# Patient Record
Sex: MALE | Employment: FULL TIME | URBAN - METROPOLITAN AREA
[De-identification: names, ages, dates, MRNs, and addresses within clinical notes are randomized per-mention and may not be internally consistent; named-entity substitution may affect disease eponyms.]

---

## 2020-04-13 ENCOUNTER — EMPLOYEE HEALTH (OUTPATIENT)
Dept: OTHER | Facility: HOSPITAL | Age: 40
End: 2020-04-13
Attending: FAMILY MEDICINE

## 2020-04-13 DIAGNOSIS — Z11.1 SCREENING-PULMONARY TB: Primary | ICD-10-CM

## 2020-04-13 DIAGNOSIS — Z01.84 IMMUNITY STATUS TESTING: ICD-10-CM

## 2020-04-13 PROCEDURE — 86787 VARICELLA-ZOSTER ANTIBODY: CPT

## 2020-04-13 PROCEDURE — 86480 TB TEST CELL IMMUN MEASURE: CPT

## 2020-12-18 ENCOUNTER — IMMUNIZATION (OUTPATIENT)
Dept: LAB | Facility: HOSPITAL | Age: 40
End: 2020-12-18
Attending: PREVENTIVE MEDICINE

## 2020-12-18 DIAGNOSIS — Z23 NEED FOR VACCINATION: ICD-10-CM

## 2020-12-18 PROCEDURE — 0001A PFIZER-BIONTECH COVID-19 VACCINE: CPT

## 2021-01-08 ENCOUNTER — IMMUNIZATION (OUTPATIENT)
Dept: LAB | Facility: HOSPITAL | Age: 41
End: 2021-01-08
Attending: PREVENTIVE MEDICINE

## 2021-01-08 DIAGNOSIS — Z23 NEED FOR VACCINATION: ICD-10-CM

## 2021-01-08 PROCEDURE — 0002A SARSCOV2 VAC 30MCG/0.3ML IM: CPT

## 2021-03-16 ENCOUNTER — TELEPHONE (OUTPATIENT)
Dept: INTERNAL MEDICINE CLINIC | Facility: HOSPITAL | Age: 41
End: 2021-03-16

## 2021-03-16 ENCOUNTER — LAB ENCOUNTER (OUTPATIENT)
Dept: LAB | Age: 41
End: 2021-03-16
Attending: PREVENTIVE MEDICINE
Payer: COMMERCIAL

## 2021-03-16 DIAGNOSIS — Z20.822 SUSPECTED COVID-19 VIRUS INFECTION: ICD-10-CM

## 2021-03-16 DIAGNOSIS — Z20.822 SUSPECTED COVID-19 VIRUS INFECTION: Primary | ICD-10-CM

## 2021-03-16 LAB — SARS-COV-2 RNA RESP QL NAA+PROBE: NOT DETECTED

## 2021-03-16 NOTE — TELEPHONE ENCOUNTER
Department: Linen                               [x] [facility]  [facility]   [] [facility]    Dept Manager/Supervisor/team or clinical lead: Sandy Santiago    Position:  [] MD     [] RN     [] Respiratory Therapist     [] PCT     [] Other: linen Services When was the last shift you worked? Saturday night: 03.13.2021  When are you next scheduled to work?  Today,     Did you have close contact with someone on your unit while not wearing a mask? (e.g., during meal breaks):  Yes []   No [x]    If yes, who: an appointment for their testing and remain in their vehicle when arrived at site until they are contacted for testing    [x]  Plan noted above  [x]  Length of time to obtain results  [x]  Quarantine instructions  [x]  S/S of worsening infection/condition

## 2021-03-16 NOTE — TELEPHONE ENCOUNTER
Results and RTW guidelines:    COVID RESULT GIVEN:      Test type:    [] Rapid         [x] Alinity      [x] NEGATIVE     Ordered Alinity retest?  []Yes   [x] No   Notes: Fully Vaccinated, living in house with 7 Covid Positive ppl.  Temp 99.5-100 with me days prior to RTW date  [] RTW immediately, continue to monitor for sx  [x] RTW when sx improve- fever free for 24 hours w/o medications, Diarrhea/Vomiting for 24 hours w/o medications  [x] Alinity ordered; continue to monitor sx and call for new/worsening

## 2021-03-17 ENCOUNTER — TELEPHONE (OUTPATIENT)
Dept: INTERNAL MEDICINE CLINIC | Facility: HOSPITAL | Age: 41
End: 2021-03-17

## 2021-03-18 ENCOUNTER — LAB ENCOUNTER (OUTPATIENT)
Dept: LAB | Age: 41
End: 2021-03-18
Attending: NURSE PRACTITIONER
Payer: COMMERCIAL

## 2021-03-18 DIAGNOSIS — Z20.822 SUSPECTED COVID-19 VIRUS INFECTION: ICD-10-CM

## 2021-03-19 LAB — SARS-COV-2 RNA RESP QL NAA+PROBE: NOT DETECTED

## 2022-01-01 ENCOUNTER — TELEPHONE (OUTPATIENT)
Dept: INTERNAL MEDICINE CLINIC | Facility: HOSPITAL | Age: 42
End: 2022-01-01

## 2022-01-01 NOTE — TELEPHONE ENCOUNTER
Department: Linen                                [x] [facility]  [][facility]   [] [address] [place] [address]    Dept Manager/Supervisor/team or clinical lead: Nile Ramos    Position:  [] MD     [] RN     [] Respiratory Therapist     [] PCT     [] PSR      [] BHA     [] MA [x] Other: Cleatrice Scarlet was the last shift you worked? 12/28/21  When are you next scheduled to work? Did you have close contact with someone on your unit while not wearing a mask? (e.g., during meal breaks):  Yes []   No [x]    If yes, who:   Do you share a workspace?  Yes [] [x]      On day 5, if asymptomatic or mildly symptomatic (with improving symptoms) may return to work day 6  [x]      On day 5, if symptomatic, call Employee Health for RTW screening        [x]  Plan noted above  []  Length of time to obtain results  []

## 2022-01-03 NOTE — TELEPHONE ENCOUNTER
Outside Covid Testing done    Results and RTW guidelines:    COVID RESULT reported:      Test type:    [] Rapid outside         [x] Antigen outside    Date of test:  01/02/2022    Test location:  [facility]        [] Result viewed in Epic with verbal diarrhea   - Keep communication open with management about RTW and if symptoms worsen     Notes: Employee is outside of exposure testing window    RTW PLAN:    []  If COVID positive results, off work minimum of 5 days from positive test or onset of symptom

## 2022-10-27 ENCOUNTER — IMMUNIZATION (OUTPATIENT)
Dept: LAB | Facility: HOSPITAL | Age: 42
End: 2022-10-27
Attending: PREVENTIVE MEDICINE
Payer: COMMERCIAL

## 2022-10-27 DIAGNOSIS — Z23 NEED FOR VACCINATION: Primary | ICD-10-CM

## 2022-10-27 PROCEDURE — 90471 IMMUNIZATION ADMIN: CPT

## 2022-10-27 PROCEDURE — 0124A SARSCOV2 VAC BVL 30MCG/0.3ML: CPT
